# Patient Record
(demographics unavailable — no encounter records)

---

## 2025-05-01 NOTE — PHYSICAL EXAM
[Abdomen Soft] : soft [Costovertebral Angle Tenderness] : no ~M costovertebral angle tenderness [de-identified] : erythema intertrigonal fold lower abdominal; raised area R buttocks no signficant erythema, no crepitus or fluctuance [Chaperone Present] : A chaperone was present in the examining room during all aspects of the physical examination [FreeTextEntry2] : Bekah SAHA

## 2025-05-01 NOTE — HISTORY OF PRESENT ILLNESS
[FreeTextEntry1] : Name RODRICK MARIE  MRN [id number]   May 27 1940  Contact Number: [phone] ----------------------------------------------------------------------------------------------------------------------------------------- Date of First Visit: 25 Referring Provider/PCP: between PCP/Dr. Fernandez  -----------------------------------------------------------------------------------------------------------------------------------------  CC: hydronephrosis  History of Present Illness: RODRICK MARIE is a 84 year old male with PMH afib, BPH, HTN, venous insufficiency  previously seen by urology for nephrolithiasis, urinary retention, and hydronephrosis. When seen in  refused all interventions.   On 3/27/25 patient presented to [facility] with cellulitis R buttocks, found to be septic, admitted to [facility]. Labs in ED notable for WBC 17, Cr 1.83, UA nit neg LE neg no significant WEBC or RBC, + protein. Urine culture ultimately negative. CT A/P IV: Severe left hydronephrosis and hydroureter. 0.2 cm stone at the periphery of the distal left ureter (2:86). Decreased enhancement of the left kidney compared to the right. Focal areas of scarring in the right kidney. Punctate nonobstructing stones in the right kidney. 0.2 cm stone at the periphery of the distal right ureter (2:87). Mild right hydroureter. No right hydronephrosis. Subcutaneous fat stranding at the medial right thigh, partially imaged moderate fat-containing right inguinal hernia with trace fluid and fat stranding within the hernia sac   consulted, PVR was ~700. Patient denied acute urinary issues. Denied frequency, urgency, dysuria, hematuria, flank pain, sensation of incomplete void, dribbling. Normal PO intake and BM per patient. He adamantly refused any urologic interventions at the time. He was ultimately discharged 3/31/25 on po course of antibiotics. Swelling has significantly decrease and continues to decrease even after antibiotics. No fevers or chills since discharge.  In : PVR near 1L, severe L hydroureteronephrosis, obstructing 5mm ureteral stone at the time. Did not follow up as patient disagreed with recommendations.   Patient reports since d/c: no fevers or chills. No urinary symptoms. No flank pain. Reports developed rash intertrigonal folds after completed abx and has been putting caldesene which has been helping.  Imaging: CT ABDOMEN AND PELVIS IC   FINDINGS: LOWER CHEST: Cardiomegaly. Calcification at the aortic valve suggestive of aortic stenosis. Mild bibasilar atelectasis. Small hiatal hernia. LIVER: Within normal limits. BILE DUCTS: Mildly dilated, most likely representing postcholecystectomy reservoir state. GALLBLADDER: Cholecystectomy. SPLEEN: Within normal limits. PANCREAS: Within normal limits. ADRENALS: Within normal limits. KIDNEYS/URETERS: Severe left hydronephrosis and hydroureter. 0.2 cm stone at the periphery of the distal left ureter (2:86). Decreased enhancement of the left kidney compared to the right. Focal areas of scarring in the right kidney. Punctate nonobstructing stones in the right kidney. 0.2 cm stone at the periphery of the distal right ureter (2:87). Mild right hydroureter. No right hydronephrosis. BLADDER: Markedly distended bladder. REPRODUCTIVE ORGANS: Prostate is enlarged. BOWEL: Colonic diverticulosis. Diverticulum at the second part of duodenum. No bowel obstruction. Appendix is not visualized. No evidence of inflammation in the pericecal region. PERITONEUM/RETROPERITONEUM: Within normal limits. VESSELS: Atherosclerotic changes. LYMPH NODES: No lymphadenopathy. ABDOMINAL WALL/SOFT TISSUES: Subcutaneous fat stranding at the medial right thigh, partially imaged. No distinct fluid collection. Some fat stranding extends into the perineum. No perianal or perirectal lesion is identified. Moderate fat-containing right inguinal hernia with trace fluid and fat stranding within the herniasac. Diastasis recti. Small fat-containing umbilical hernia. BONES: Degenerative changes.  IMPRESSION: 1. Markedly distended bladder concerning for bladder outlet obstruction, probably due to an enlarged prostate. Severe left hydronephrosis and hydroureter without an obstructing lesion identified. Punctate stones at the distal ureters do not appear obstructing. Decreased enhancement of the left kidney, consistent with decreased renal function. Mild right hydroureter. 2. No perianal or perirectal lesion is identified. Subcutaneous fat stranding at the medial right thigh, partially imaged, consistent with reported cellulitis. No associated abscess. 3. Moderate fat-containing right inguinal hernia with associated inflammatory changes within the hernia sac, possibly representing a strangulated hernia. Correlate clinically.  Labs 3/31 on Dc: WBC 9.82 Cr 1.63 (1.0 in ) UCx no growth   PVR (to ensure adequate emptying): ~700cc  PMH: afib, BPH, HTN, HLD, venous insufficiency, nephrolithiasis, urinary retention PSH: cataract surgery, cholecystectomy, appendectomy, T&A Family History: no  malignancies  Social: single, no children, retired  [place], never smoker, social alcohol, no recreational drugs Meds: Eliquis but has not been compliant Allergies: sulfa, amox ROS: no fevers, chills, flank pain

## 2025-05-01 NOTE — ASSESSMENT
[FreeTextEntry1] : 84M with above PMHx afib, BPH, HTN, venous insufficiency, chronic urinary retention, nephrolithiasis with recent admission for cellulitis. Completed course abx with improvement - does not have PCP fu in place.  With regards to urinary retention - has been present since at least 2022 when patient refused intervention. Again present on recent admission. Resultant hydronephrosis - worsened from prior. Worsened kidney function as well on recent hospitalization. Patient reports voiding well any denies and urinary symptoms. I again discussed risks associated with significant retention with patient including bladder perforation, infection, sepsis, worsening renal function/failure, death. Patient does not wish to proceed with christopher. Discussed CIC and SPT as well - refusing intervention for drainage. Understands risks. Discussed role of UDS/TRUS - patient not interested in further work-up and would not proceed with any outlet procedures regardless. Discussed medications such as Finasteride and alpha blockers to determine if any impact which patient refused as well.  With regardless to nephrolithiasis, discussed history of stone in 2022 (refused intervention then) and small stones present. Discussed US now to determine if any passage, presence of jets. Patient reports he is asymptomatic and would not proceed with intervention regardless. Does not wish to proceed with US today.  With regards to his recent cellulitis and hospitalization - does not have follow-up in place. No fevers or chills. Raised area by buttocks still present but patient reports continues to improve and denies fevers/chills. No fluctuance/crepitus or signs of active infection but still present. Emailed referral network for urgent PCP. patient reports was told to follow-up at [facility] with any issues and he reports he plans on going there today. Also developed fungal dermatitis intertrigonal after completed abx. Reports has been improving with home remedies - will prescribe nystatin powder to area - patient reports will follow at [facility] for this too and assured me will follow-up with PCP when referral network calls. Warning precautions discussed.   Plan: - UA culture - BMP - refused renal US - does not want to monitor stone passage - refused meds, refused christopher/CIC/SP - not interested in surgical procedure - fu in 3 months - warning precautions discussed - patient understands risks of worsening retention, worsening kidney damage, risk of renal failure, sepsis, death --> not [place] in any interventions at [facility] time but will follow-up

## 2025-05-01 NOTE — ASSESSMENT
All labs reviewed and WNL per anesthesia protocol. Labs routed to surgeons office. Chart flagged to have charge nurse follow up on pending Nicotine results. [FreeTextEntry1] : 84M with above PMHx afib, BPH, HTN, venous insufficiency, chronic urinary retention, nephrolithiasis with recent admission for cellulitis. Completed course abx with improvement - does not have PCP fu in place.  With regards to urinary retention - has been present since at least 2022 when patient refused intervention. Again present on recent admission. Resultant hydronephrosis - worsened from prior. Worsened kidney function as well on recent hospitalization. Patient reports voiding well any denies and urinary symptoms. I again discussed risks associated with significant retention with patient including bladder perforation, infection, sepsis, worsening renal function/failure, death. Patient does not wish to proceed with christopher. Discussed CIC and SPT as well - refusing intervention for drainage. Understands risks. Discussed role of UDS/TRUS - patient not interested in further work-up and would not proceed with any outlet procedures regardless. Discussed medications such as Finasteride and alpha blockers to determine if any impact which patient refused as well.  With regardless to nephrolithiasis, discussed history of stone in 2022 (refused intervention then) and small stones present. Discussed US now to determine if any passage, presence of jets. Patient reports he is asymptomatic and would not proceed with intervention regardless. Does not wish to proceed with US today.  With regards to his recent cellulitis and hospitalization - does not have follow-up in place. No fevers or chills. Raised area by buttocks still present but patient reports continues to improve and denies fevers/chills. No fluctuance/crepitus or signs of active infection but still present. Emailed referral network for urgent PCP. patient reports was told to follow-up at [facility] with any issues and he reports he plans on going there today. Also developed fungal dermatitis intertrigonal after completed abx. Reports has been improving with home remedies - will prescribe nystatin powder to area - patient reports will follow at [facility] for this too and assured me will follow-up with PCP when referral network calls. Warning precautions discussed.   Plan: - UA culture - BMP - refused renal US - does not want to monitor stone passage - refused meds, refused christopher/CIC/SP - not interested in surgical procedure - fu in 3 months - warning precautions discussed - patient understands risks of worsening retention, worsening kidney damage, risk of renal failure, sepsis, death --> not [place] in any interventions at [facility] time but will follow-up

## 2025-05-01 NOTE — HISTORY OF PRESENT ILLNESS
[FreeTextEntry1] : Name RODRICK MARIE  MRN [id number]   May 27 1940  Contact Number: [phone] ----------------------------------------------------------------------------------------------------------------------------------------- Date of First Visit: 25 Referring Provider/PCP: between PCP/Dr. Fernandez  -----------------------------------------------------------------------------------------------------------------------------------------  CC: hydronephrosis  History of Present Illness: RODRICK MARIE is a 84 year old male with PMH afib, BPH, HTN, venous insufficiency  previously seen by urology for nephrolithiasis, urinary retention, and hydronephrosis. When seen in  refused all interventions.   On 3/27/25 patient presented to [facility] with cellulitis R buttocks, found to be septic, admitted to [facility]. Labs in ED notable for WBC 17, Cr 1.83, UA nit neg LE neg no significant WEBC or RBC, + protein. Urine culture ultimately negative. CT A/P IV: Severe left hydronephrosis and hydroureter. 0.2 cm stone at the periphery of the distal left ureter (2:86). Decreased enhancement of the left kidney compared to the right. Focal areas of scarring in the right kidney. Punctate nonobstructing stones in the right kidney. 0.2 cm stone at the periphery of the distal right ureter (2:87). Mild right hydroureter. No right hydronephrosis. Subcutaneous fat stranding at the medial right thigh, partially imaged moderate fat-containing right inguinal hernia with trace fluid and fat stranding within the hernia sac   consulted, PVR was ~700. Patient denied acute urinary issues. Denied frequency, urgency, dysuria, hematuria, flank pain, sensation of incomplete void, dribbling. Normal PO intake and BM per patient. He adamantly refused any urologic interventions at the time. He was ultimately discharged 3/31/25 on po course of antibiotics. Swelling has significantly decrease and continues to decrease even after antibiotics. No fevers or chills since discharge.  In : PVR near 1L, severe L hydroureteronephrosis, obstructing 5mm ureteral stone at the time. Did not follow up as patient disagreed with recommendations.   Patient reports since d/c: no fevers or chills. No urinary symptoms. No flank pain. Reports developed rash intertrigonal folds after completed abx and has been putting caldesene which has been helping.  Imaging: CT ABDOMEN AND PELVIS IC   FINDINGS: LOWER CHEST: Cardiomegaly. Calcification at the aortic valve suggestive of aortic stenosis. Mild bibasilar atelectasis. Small hiatal hernia. LIVER: Within normal limits. BILE DUCTS: Mildly dilated, most likely representing postcholecystectomy reservoir state. GALLBLADDER: Cholecystectomy. SPLEEN: Within normal limits. PANCREAS: Within normal limits. ADRENALS: Within normal limits. KIDNEYS/URETERS: Severe left hydronephrosis and hydroureter. 0.2 cm stone at the periphery of the distal left ureter (2:86). Decreased enhancement of the left kidney compared to the right. Focal areas of scarring in the right kidney. Punctate nonobstructing stones in the right kidney. 0.2 cm stone at the periphery of the distal right ureter (2:87). Mild right hydroureter. No right hydronephrosis. BLADDER: Markedly distended bladder. REPRODUCTIVE ORGANS: Prostate is enlarged. BOWEL: Colonic diverticulosis. Diverticulum at the second part of duodenum. No bowel obstruction. Appendix is not visualized. No evidence of inflammation in the pericecal region. PERITONEUM/RETROPERITONEUM: Within normal limits. VESSELS: Atherosclerotic changes. LYMPH NODES: No lymphadenopathy. ABDOMINAL WALL/SOFT TISSUES: Subcutaneous fat stranding at the medial right thigh, partially imaged. No distinct fluid collection. Some fat stranding extends into the perineum. No perianal or perirectal lesion is identified. Moderate fat-containing right inguinal hernia with trace fluid and fat stranding within the herniasac. Diastasis recti. Small fat-containing umbilical hernia. BONES: Degenerative changes.  IMPRESSION: 1. Markedly distended bladder concerning for bladder outlet obstruction, probably due to an enlarged prostate. Severe left hydronephrosis and hydroureter without an obstructing lesion identified. Punctate stones at the distal ureters do not appear obstructing. Decreased enhancement of the left kidney, consistent with decreased renal function. Mild right hydroureter. 2. No perianal or perirectal lesion is identified. Subcutaneous fat stranding at the medial right thigh, partially imaged, consistent with reported cellulitis. No associated abscess. 3. Moderate fat-containing right inguinal hernia with associated inflammatory changes within the hernia sac, possibly representing a strangulated hernia. Correlate clinically.  Labs 3/31 on Dc: WBC 9.82 Cr 1.63 (1.0 in ) UCx no growth   PVR (to ensure adequate emptying): ~700cc  PMH: afib, BPH, HTN, HLD, venous insufficiency, nephrolithiasis, urinary retention PSH: cataract surgery, cholecystectomy, appendectomy, T&A Family History: no  malignancies  Social: single, no children, retired  [place], never smoker, social alcohol, no recreational drugs Meds: Eliquis but has not been compliant Allergies: sulfa, amox ROS: no fevers, chills, flank pain

## 2025-05-01 NOTE — PHYSICAL EXAM
[Abdomen Soft] : soft [Costovertebral Angle Tenderness] : no ~M costovertebral angle tenderness [de-identified] : erythema intertrigonal fold lower abdominal; raised area R buttocks no signficant erythema, no crepitus or fluctuance [Chaperone Present] : A chaperone was present in the examining room during all aspects of the physical examination [FreeTextEntry2] : Bekah SAHA

## 2025-06-19 NOTE — PHYSICAL EXAM
[de-identified] : irregularly irregular HR [de-identified] : b/l inguinal hernias- no signs of infection or strangulation.  [de-identified] : tinea cruris on buttocks, inguinal areas.

## 2025-06-19 NOTE — PHYSICAL EXAM
[de-identified] : irregularly irregular HR [de-identified] : b/l inguinal hernias- no signs of infection or strangulation.  [de-identified] : tinea cruris on buttocks, inguinal areas.

## 2025-06-19 NOTE — ASSESSMENT
[FreeTextEntry1] : 85 yrs old M with PMHx of NSTEMI in 2018( clean coronaries as per pt), ? TIA,  afib on AC, BPH, HTN, venous insufficiency, chronic urinary retention, nephrolithiasis comes in to establish care.   # perineal cellulites - resolved  # tinea cruris: plan: start ketoconazole 2% cream BID  # SANDY on CKD # urinary retention:  -Pt was seen by urology outpatient, and he refused investigations, interventions and medications. patient understands risks of worsening retention, worsening kidney damage, risk of renal failure, sepsis, death.   # uncontrolled HTN: BP above goal today   was not taking HCTZ 12.5 mg OD, not on any other medications.   Plan: start metoprolol 25 mg BID cont HCTZ 12.5 mg OD DASH diet BP log at home  RTC in 1 month.   # chronic afib: CHADSVASC: 3 Will start Eliquis at reduced dose for age >80 and Cr >1.5. Rate controlled - c/w PO Eliquis 2.5 mg q12h.  # b/l inguinal hernia CT pelvis: 03/2025: IMPRESSION: Right fat-containing inguinal hernia with inflammatory change/edema. Correlate for strangulation. Cutaneous thickening and ill-defined subcutaneous edema along the right perineum and posteriorly in the region of the right gluteal fold and predominantly interproximal 5. No soft tissue air or abscess.  no signs of infection or strangulation now  Plan: Gen. surgery follow up if patient agree.

## 2025-06-19 NOTE — PHYSICAL EXAM
[de-identified] : irregularly irregular HR [de-identified] : b/l inguinal hernias- no signs of infection or strangulation.  [de-identified] : tinea cruris on buttocks, inguinal areas.

## 2025-06-19 NOTE — HISTORY OF PRESENT ILLNESS
[FreeTextEntry1] : rash uncontrolled HTN.  [de-identified] : 85 yrs old M with PMHx of NSTEMI in 2018( clean coronaries as per pt), ? TIA,  afib on AC, BPH, HTN, venous insufficiency, chronic urinary retention, nephrolithiasis comes in to establish care.  Pt was admitted in 03/2025 for sepsis 2/2 perineal cellulitis, SANDY on CKD, acute on chronic urinary retention, was given iv antibiotics, and then switched to oral, says that the infection has resolved now but the antibiotics gave him skin fungal infection which he still has.  Pt was seen by urology outpatient, and he refused investigations, interventions and medications. patient understands risks of worsening retention, worsening kidney damage, risk of renal failure, sepsis, death.   uncontrolled HTN:  was not taking HCTZ 12.5 mg OD, not on any other medications.

## 2025-06-19 NOTE — HISTORY OF PRESENT ILLNESS
[FreeTextEntry1] : rash uncontrolled HTN.  [de-identified] : 85 yrs old M with PMHx of NSTEMI in 2018( clean coronaries as per pt), ? TIA,  afib on AC, BPH, HTN, venous insufficiency, chronic urinary retention, nephrolithiasis comes in to establish care.  Pt was admitted in 03/2025 for sepsis 2/2 perineal cellulitis, SANDY on CKD, acute on chronic urinary retention, was given iv antibiotics, and then switched to oral, says that the infection has resolved now but the antibiotics gave him skin fungal infection which he still has.  Pt was seen by urology outpatient, and he refused investigations, interventions and medications. patient understands risks of worsening retention, worsening kidney damage, risk of renal failure, sepsis, death.   uncontrolled HTN:  was not taking HCTZ 12.5 mg OD, not on any other medications.

## 2025-06-19 NOTE — HISTORY OF PRESENT ILLNESS
[FreeTextEntry1] : rash uncontrolled HTN.  [de-identified] : 85 yrs old M with PMHx of NSTEMI in 2018( clean coronaries as per pt), ? TIA,  afib on AC, BPH, HTN, venous insufficiency, chronic urinary retention, nephrolithiasis comes in to establish care.  Pt was admitted in 03/2025 for sepsis 2/2 perineal cellulitis, SANDY on CKD, acute on chronic urinary retention, was given iv antibiotics, and then switched to oral, says that the infection has resolved now but the antibiotics gave him skin fungal infection which he still has.  Pt was seen by urology outpatient, and he refused investigations, interventions and medications. patient understands risks of worsening retention, worsening kidney damage, risk of renal failure, sepsis, death.   uncontrolled HTN:  was not taking HCTZ 12.5 mg OD, not on any other medications.